# Patient Record
Sex: MALE | Race: WHITE | NOT HISPANIC OR LATINO | Employment: OTHER | URBAN - METROPOLITAN AREA
[De-identification: names, ages, dates, MRNs, and addresses within clinical notes are randomized per-mention and may not be internally consistent; named-entity substitution may affect disease eponyms.]

---

## 2018-09-02 ENCOUNTER — APPOINTMENT (OUTPATIENT)
Dept: GENERAL RADIOLOGY | Facility: CLINIC | Age: 63
End: 2018-09-02
Attending: EMERGENCY MEDICINE
Payer: COMMERCIAL

## 2018-09-02 ENCOUNTER — HOSPITAL ENCOUNTER (EMERGENCY)
Facility: CLINIC | Age: 63
Discharge: HOME | End: 2018-09-03
Attending: EMERGENCY MEDICINE | Admitting: EMERGENCY MEDICINE
Payer: COMMERCIAL

## 2018-09-02 DIAGNOSIS — R07.89 LEFT-SIDED CHEST WALL PAIN: ICD-10-CM

## 2018-09-02 PROCEDURE — 99284 EMERGENCY DEPT VISIT MOD MDM: CPT | Mod: Z6 | Performed by: EMERGENCY MEDICINE

## 2018-09-02 PROCEDURE — 99284 EMERGENCY DEPT VISIT MOD MDM: CPT | Mod: 25

## 2018-09-02 PROCEDURE — 96374 THER/PROPH/DIAG INJ IV PUSH: CPT

## 2018-09-02 PROCEDURE — 25000128 H RX IP 250 OP 636: Performed by: EMERGENCY MEDICINE

## 2018-09-02 PROCEDURE — 71101 X-RAY EXAM UNILAT RIBS/CHEST: CPT | Mod: LT

## 2018-09-02 RX ORDER — KETOROLAC TROMETHAMINE 30 MG/ML
30 INJECTION, SOLUTION INTRAMUSCULAR; INTRAVENOUS ONCE
Status: COMPLETED | OUTPATIENT
Start: 2018-09-02 | End: 2018-09-02

## 2018-09-02 RX ADMIN — KETOROLAC TROMETHAMINE 30 MG: 30 INJECTION, SOLUTION INTRAMUSCULAR at 23:54
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
## 2018-09-02 NOTE — ED AVS SNAPSHOT
[facility] Emergency Department    [address] [facility] [address]    Phone:  [phone]    Fax:  [phone]                                       Michael Ugarte   MRN: [id number]    Department:  [facility] Emergency Department   Date of Visit:  9/2/2018           After Visit Summary Signature Page     I have received my discharge instructions, and my questions have been answered. I have discussed any challenges I see with this plan with the nurse or doctor.    ..........................................................................................................................................  Patient/Patient Representative Signature      ..........................................................................................................................................  Patient Representative Print Name and Relationship to Patient    ..................................................               ................................................  Date                                            Time    ..........................................................................................................................................  Reviewed by Signature/Title    ...................................................              ..............................................  Date                                                            Time          22EPIC Rev 08/18

## 2018-09-03 VITALS
DIASTOLIC BLOOD PRESSURE: 111 MMHG | OXYGEN SATURATION: 96 % | BODY MASS INDEX: 26.22 KG/M2 | RESPIRATION RATE: 18 BRPM | TEMPERATURE: 98.3 F | WEIGHT: 173 LBS | SYSTOLIC BLOOD PRESSURE: 160 MMHG | HEIGHT: 68 IN

## 2018-09-03 NOTE — DISCHARGE INSTRUCTIONS
X-ray shows no broken bones  Tylenol and ibuprofen as needed for pain        Chest Wall Pain: Costochondritis    The chest pain that you have had today is caused by costochondritis. This condition is caused by an inflammation of the cartilage joining your ribs to your breastbone. It is not caused by heart or lung problems. Your healthcare team has made sure that the chest pain you feel is not from a life threatening cause of chest pain such as heart attack, collapsed lung, blood clot in the lung, tear in the aorta, or esophageal rupture. The inflammation may have been brought on by a blow to the chest, lifting heavy objects, intense exercise, or an illness that made you cough and sneeze a lot. It often occurs during times of emotional stress. It can be painful, but it is not dangerous. It usually goes away in 1 to 2 weeks. But it may happen again. Rarely, a more serious condition may cause symptoms similar to costochondritis. That s why it s important to watch for the warning signs listed below.  Home care  Follow these guidelines when caring for yourself at home:    If you feel that emotional stress is a cause of your condition, try to figure out the sources of that stress. It may not be obvious. Learn ways to deal with the stress in your life. This can include regular exercise, muscle relaxation, meditation, or simply taking time out for yourself.    You may use acetaminophen, ibuprofen, or naproxen to control pain, unless another pain medicine was prescribed. If you have liver or kidney disease or ever had a stomach ulcer, talk with your healthcare provider before using these medicines.    You can also help ease pain by using a hot, wet compress or heating pad. Use this with or without a medicated skin cream that helps relieves pain.    Do stretching exercise as advised by your provider.    Take any prescribed medicines as directed.  Follow-up care  Follow up with your healthcare provider, or as advised, if you  do not start to get better in the next 2 days.  When to seek medical advice  Call your healthcare provider right away if any of these occur:    A change in the type of pain. Call if it feels different, becomes more serious, lasts longer, or spreads into your shoulder, arm, neck, jaw, or back.    Shortness of breath or pain gets worse when you breathe    Weakness, dizziness, or fainting    Cough with dark-colored sputum (phlegm) or blood    Abdominal pain    Dark red or black stools    Fever of 100.4 F (38 C) or higher, or as directed by your healthcare provider  Date Last Reviewed: 12/1/2016 2000-2017 [employer]. [address] [place] [address], [place] [address]. All rights reserved. This information is not intended as a substitute for professional medical care. Always follow your healthcare professional's instructions.
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
## 2021-05-24 ENCOUNTER — RECORDS - HEALTHEAST (OUTPATIENT)
Dept: ADMINISTRATIVE | Facility: CLINIC | Age: 66
End: 2021-05-24

## 2021-05-27 ENCOUNTER — RECORDS - HEALTHEAST (OUTPATIENT)
Dept: ADMINISTRATIVE | Facility: CLINIC | Age: 66
End: 2021-05-27

## 2021-05-28 ENCOUNTER — RECORDS - HEALTHEAST (OUTPATIENT)
Dept: ADMINISTRATIVE | Facility: CLINIC | Age: 66
End: 2021-05-28

## 2021-09-06 ENCOUNTER — HOSPITAL ENCOUNTER (EMERGENCY)
Facility: CLINIC | Age: 66
Discharge: HOME | End: 2021-09-06
Attending: NURSE PRACTITIONER | Admitting: NURSE PRACTITIONER
Payer: COMMERCIAL

## 2021-09-06 ENCOUNTER — APPOINTMENT (OUTPATIENT)
Dept: GENERAL RADIOLOGY | Facility: CLINIC | Age: 66
End: 2021-09-06
Attending: NURSE PRACTITIONER
Payer: COMMERCIAL

## 2021-09-06 VITALS
HEART RATE: 95 BPM | TEMPERATURE: 98.4 F | BODY MASS INDEX: 26.61 KG/M2 | WEIGHT: 175 LBS | SYSTOLIC BLOOD PRESSURE: 169 MMHG | DIASTOLIC BLOOD PRESSURE: 105 MMHG | RESPIRATION RATE: 16 BRPM | OXYGEN SATURATION: 97 %

## 2021-09-06 DIAGNOSIS — S22.39XA RIB FRACTURE: ICD-10-CM

## 2021-09-06 PROCEDURE — 99284 EMERGENCY DEPT VISIT MOD MDM: CPT | Performed by: NURSE PRACTITIONER

## 2021-09-06 PROCEDURE — 71101 X-RAY EXAM UNILAT RIBS/CHEST: CPT | Mod: RT

## 2021-09-06 PROCEDURE — 99283 EMERGENCY DEPT VISIT LOW MDM: CPT | Performed by: NURSE PRACTITIONER

## 2021-09-06 RX ORDER — HYDROCODONE BITARTRATE AND ACETAMINOPHEN 5; 325 MG/1; MG/1
1 TABLET ORAL EVERY 6 HOURS PRN
Qty: 10 TABLET | Refills: 0 | Status: SHIPPED | OUTPATIENT
Start: 2021-09-06 | End: 2021-09-09

## 2021-09-06 ASSESSMENT — ENCOUNTER SYMPTOMS
NECK STIFFNESS: 0
BACK PAIN: 1
NECK PAIN: 0
WOUND: 1
MYALGIAS: 1
GASTROINTESTINAL NEGATIVE: 1
CARDIOVASCULAR NEGATIVE: 1
ARTHRALGIAS: 0
CONSTITUTIONAL NEGATIVE: 1
NEUROLOGICAL NEGATIVE: 1
JOINT SWELLING: 0
RESPIRATORY NEGATIVE: 1

## 2021-09-06 NOTE — ED TRIAGE NOTES
"Pt fell backwards onto a coffee table on Friday while trying to take off shoes.  Pt states that pain is worsening over weekend, felt a \"pop\" or a \"snap\" last night when he rolled over in bed and pain got worse.  Pt ambulates slowly d/t pain. No changes in bowel/bladder function.   "

## 2021-09-06 NOTE — ED PROVIDER NOTES
History     Chief Complaint   Patient presents with     Back Pain     HPI  Michael Ugarte is a 65 year old male who presents to the emergency department for evaluation of right posterior rib pain after falling 3 days ago.  Patient was standing trying to remove his shoe when he lost balance and fell backwards striking the corner of the wooden table to the right posterior ribs.  Patient denies any head injury or loss of consciousness.  No midline back pain.  No loss of bowel or bladder control, no numbness or tingling.  Difficulty sleeping due to pain.  Has been trying over-the-counter options without relief.    Allergies:  No Known Allergies    Problem List:    There are no problems to display for this patient.       Past Medical History:    No past medical history on file.    Past Surgical History:    No past surgical history on file.    Family History:    No family history on file.    Social History:  Marital Status:   [2]  Social History     Tobacco Use     Smoking status: Not on file   Substance Use Topics     Alcohol use: Not on file     Drug use: Not on file        Medications:    HYDROcodone-acetaminophen (NORCO) 5-325 MG tablet          Review of Systems   Constitutional: Negative.    Respiratory: Negative.    Cardiovascular: Negative.    Gastrointestinal: Negative.    Musculoskeletal: Positive for back pain and myalgias. Negative for arthralgias, gait problem, joint swelling, neck pain and neck stiffness.   Skin: Positive for wound.   Neurological: Negative.        Physical Exam   BP: (!) 169/105  Pulse: 95  Temp: 98.4  F (36.9  C)  Resp: 16  Weight: 79.4 kg (175 lb)  SpO2: 97 %      Physical Exam  Constitutional:       General: He is not in acute distress.     Appearance: He is well-developed. He is not diaphoretic.   HENT:      Head: Normocephalic.   Eyes:      Conjunctiva/sclera: Conjunctivae normal.      Pupils: Pupils are equal, round, and reactive to light.   Cardiovascular:      Rate and  Rhythm: Normal rate and regular rhythm.      Pulses: Normal pulses.   Pulmonary:      Effort: Pulmonary effort is normal. No respiratory distress.      Breath sounds: Normal breath sounds and air entry. No decreased air movement. No decreased breath sounds, wheezing or rhonchi.   Abdominal:      General: There is no distension.      Palpations: Abdomen is soft.      Tenderness: There is no abdominal tenderness.   Musculoskeletal:      Cervical back: Normal range of motion and neck supple.        Back:       Comments: Superficial abrasion to the right posterior area indicated above. Surrounding tenderness. No edema, erythema or ecchymosis. No difficulty breathing.    Skin:     General: Skin is warm.      Capillary Refill: Capillary refill takes less than 2 seconds.   Neurological:      General: No focal deficit present.      Mental Status: He is alert and oriented to person, place, and time.   Psychiatric:         Mood and Affect: Mood normal.         ED Course        Procedures    Results for orders placed or performed during the hospital encounter of 09/06/21 (from the past 24 hour(s))   Ribs XR, unilat 3 views + PA chest, right    Narrative    XR RIBS & CHEST RT 3VW 9/6/2021 11:18 AM     HISTORY: fall, right lower posterior rib pain  COMPARISON: 9/2/2018      Impression    IMPRESSION: Acute minimally displaced anterior right 10th rib  fracture. No discernible pneumothorax. No evidence of acute  cardiopulmonary disease.    ALONZO NEWBERRY MD         SYSTEM ID:  [id number]       Medications - No data to display    Assessments & Plan (with Medical Decision Making)   Patient is a 65-year-old male presents to the ED emergency department for evaluation of right posterior back pain after sustaining a fall 3 days ago.  Tenderness as expected on physical exam, no worrisome findings.  X-ray with minimally displaced right anterior 10th rib fracture.  Discussed rib fracture and average course of healing.  Discussed home  symptom management and limited course of Norco.  Discussed safe use of narcotics.  Follow-up with primary care if needed.  Return to the emergency department for new or worsening symptoms including respiratory distress.  Patient agreeable to plan of care and discharged in good condition.  I have reviewed the nursing notes.    I have reviewed the findings, diagnosis, plan and need for follow up with the patient.    Discharge Medication List as of 9/6/2021 12:00 PM      START taking these medications    Details   HYDROcodone-acetaminophen (NORCO) 5-325 MG tablet Take 1 tablet by mouth every 6 hours as needed for severe pain, Disp-10 tablet, R-0, E-Prescribe             Final diagnoses:   Rib fracture       9/6/2021   [facility] EMERGENCY DEPT     Mira Friedman, APRN CNP  09/06/21 1534

## 2022-09-05 ENCOUNTER — ANESTHESIA EVENT (OUTPATIENT)
Dept: EMERGENCY MEDICINE | Facility: CLINIC | Age: 67
End: 2022-09-05
Payer: COMMERCIAL

## 2022-09-05 ENCOUNTER — HOSPITAL ENCOUNTER (EMERGENCY)
Facility: CLINIC | Age: 67
Discharge: HOME | End: 2022-09-05
Attending: EMERGENCY MEDICINE | Admitting: EMERGENCY MEDICINE
Payer: COMMERCIAL

## 2022-09-05 ENCOUNTER — APPOINTMENT (OUTPATIENT)
Dept: GENERAL RADIOLOGY | Facility: CLINIC | Age: 67
End: 2022-09-05
Attending: EMERGENCY MEDICINE
Payer: COMMERCIAL

## 2022-09-05 ENCOUNTER — ANESTHESIA (OUTPATIENT)
Dept: EMERGENCY MEDICINE | Facility: CLINIC | Age: 67
End: 2022-09-05
Payer: COMMERCIAL

## 2022-09-05 VITALS
TEMPERATURE: 98.8 F | OXYGEN SATURATION: 96 % | HEART RATE: 112 BPM | BODY MASS INDEX: 27.47 KG/M2 | WEIGHT: 175 LBS | SYSTOLIC BLOOD PRESSURE: 167 MMHG | HEIGHT: 67 IN | RESPIRATION RATE: 19 BRPM | DIASTOLIC BLOOD PRESSURE: 106 MMHG

## 2022-09-05 DIAGNOSIS — M25.512 ACUTE PAIN OF LEFT SHOULDER: ICD-10-CM

## 2022-09-05 DIAGNOSIS — S43.005A SHOULDER DISLOCATION, LEFT, INITIAL ENCOUNTER: ICD-10-CM

## 2022-09-05 PROCEDURE — 23655 CLTX SHO DSLC W/MNPJ W/ANES: CPT | Mod: 54 | Performed by: EMERGENCY MEDICINE

## 2022-09-05 PROCEDURE — 23655 CLTX SHO DSLC W/MNPJ W/ANES: CPT | Mod: LT | Performed by: EMERGENCY MEDICINE

## 2022-09-05 PROCEDURE — 258N000003 HC RX IP 258 OP 636: Performed by: EMERGENCY MEDICINE

## 2022-09-05 PROCEDURE — 370N000003 HC ANESTHESIA WARD SERVICE

## 2022-09-05 PROCEDURE — 250N000011 HC RX IP 250 OP 636: Performed by: NURSE ANESTHETIST, CERTIFIED REGISTERED

## 2022-09-05 PROCEDURE — 99285 EMERGENCY DEPT VISIT HI MDM: CPT | Mod: 25 | Performed by: EMERGENCY MEDICINE

## 2022-09-05 PROCEDURE — 250N000011 HC RX IP 250 OP 636: Performed by: EMERGENCY MEDICINE

## 2022-09-05 PROCEDURE — 96374 THER/PROPH/DIAG INJ IV PUSH: CPT | Performed by: EMERGENCY MEDICINE

## 2022-09-05 PROCEDURE — 999N000065 XR SHOULDER LEFT 2 VIEWS: Mod: LT

## 2022-09-05 PROCEDURE — 96361 HYDRATE IV INFUSION ADD-ON: CPT | Performed by: EMERGENCY MEDICINE

## 2022-09-05 PROCEDURE — 73030 X-RAY EXAM OF SHOULDER: CPT | Mod: LT

## 2022-09-05 RX ORDER — FENTANYL CITRATE 50 UG/ML
50 INJECTION, SOLUTION INTRAMUSCULAR; INTRAVENOUS ONCE
Status: COMPLETED | OUTPATIENT
Start: 2022-09-05 | End: 2022-09-05

## 2022-09-05 RX ORDER — DOXAZOSIN 2 MG/1
2 TABLET ORAL DAILY
COMMUNITY
Start: 2022-05-23

## 2022-09-05 RX ORDER — ATORVASTATIN CALCIUM 20 MG/1
1 TABLET, FILM COATED ORAL DAILY
COMMUNITY
Start: 2022-08-28

## 2022-09-05 RX ORDER — PROPOFOL 10 MG/ML
INJECTION, EMULSION INTRAVENOUS PRN
Status: DISCONTINUED | OUTPATIENT
Start: 2022-09-05 | End: 2022-09-05

## 2022-09-05 RX ORDER — AMLODIPINE BESYLATE 10 MG/1
1 TABLET ORAL DAILY
COMMUNITY
Start: 2022-08-28

## 2022-09-05 RX ORDER — FINASTERIDE 5 MG/1
1 TABLET, FILM COATED ORAL EVERY MORNING
COMMUNITY
Start: 2022-08-28

## 2022-09-05 RX ORDER — SODIUM CHLORIDE 9 MG/ML
INJECTION, SOLUTION INTRAVENOUS CONTINUOUS
Status: DISCONTINUED | OUTPATIENT
Start: 2022-09-05 | End: 2022-09-05 | Stop reason: HOSPADM

## 2022-09-05 RX ADMIN — SODIUM CHLORIDE: 9 INJECTION, SOLUTION INTRAVENOUS at 08:57

## 2022-09-05 RX ADMIN — PROPOFOL 100 MG: 10 INJECTION, EMULSION INTRAVENOUS at 09:41

## 2022-09-05 RX ADMIN — FENTANYL CITRATE 50 MCG: 50 INJECTION, SOLUTION INTRAMUSCULAR; INTRAVENOUS at 09:38

## 2022-09-05 RX ADMIN — PROPOFOL 50 MG: 10 INJECTION, EMULSION INTRAVENOUS at 09:44

## 2022-09-05 ASSESSMENT — ACTIVITIES OF DAILY LIVING (ADL)
ADLS_ACUITY_SCORE: 35
ADLS_ACUITY_SCORE: 35

## 2022-09-05 NOTE — ANESTHESIA PREPROCEDURE EVALUATION
Anesthesia Pre-Procedure Evaluation    Patient: Michael Ugarte   MRN: [id number] : 1955        Procedure :           No past medical history on file.   No past surgical history on file.   No Known Allergies   Social History     Tobacco Use     Smoking status: Not on file     Smokeless tobacco: Not on file   Substance Use Topics     Alcohol use: Not on file      Wt Readings from Last 1 Encounters:   22 79.4 kg (175 lb)        Anesthesia Evaluation   Pt has had prior anesthetic.         ROS/MED HX  ENT/Pulmonary:  - neg pulmonary ROS     Neurologic:  - neg neurologic ROS     Cardiovascular:     (+) Dyslipidemia hypertension-----    METS/Exercise Tolerance:     Hematologic:  - neg hematologic  ROS     Musculoskeletal:   (+) arthritis,     GI/Hepatic:     (+) GERD,     Renal/Genitourinary:     (+) BPH,     Endo:  - neg endo ROS     Psychiatric/Substance Use:  - neg psychiatric ROS     Infectious Disease:  - neg infectious disease ROS     Malignancy:  - neg malignancy ROS     Other:  - neg other ROS          Physical Exam    Airway        Mallampati: II   TM distance: > 3 FB   Neck ROM: full   Mouth opening: > 3 cm    Respiratory Devices and Support         Dental  no notable dental history         Cardiovascular   cardiovascular exam normal          Pulmonary   pulmonary exam normal                OUTSIDE LABS:  CBC: No results found for: WBC, HGB, HCT, PLT  BMP: No results found for: NA, POTASSIUM, CHLORIDE, CO2, BUN, CR, GLC  COAGS: No results found for: PTT, INR, FIBR  POC: No results found for: BGM, HCG, HCGS  HEPATIC: No results found for: ALBUMIN, PROTTOTAL, ALT, AST, GGT, ALKPHOS, BILITOTAL, BILIDIRECT, AMBROSE  OTHER: No results found for: PH, LACT, A1C, JUSTYN, PHOS, MAG, LIPASE, AMYLASE, TSH, T4, T3, CRP, SED    Anesthesia Plan    ASA Status:  2      Anesthesia Type: General.   Induction: Propofol.   Maintenance: TIVA.        Consents    Anesthesia Plan(s) and associated risks, benefits, and  realistic alternatives discussed. Questions answered and patient/representative(s) expressed understanding.     - Discussed: Risks, Benefits and Alternatives for BOTH SEDATION and the PROCEDURE were discussed     - Discussed with:  Patient         Postoperative Care    Pain management: IV analgesics, Oral pain medications, Multi-modal analgesia.   PONV prophylaxis: Ondansetron (or other 5HT-3), Dexamethasone or Solumedrol     Comments:                TADEO Laureano CRNA

## 2022-09-05 NOTE — ED PROVIDER NOTES
"  History     Chief Complaint   Patient presents with     Shoulder Injury     HPI  Michael Ugarte is a 66 year old male with history of hypertension, hypercholesterolemia, tobacco use, with left shoulder pain in setting of fall.  Patient reports he was \"drinking and dancing\" last evening at his house and had a fall onto his left arm.  He says he does not recall the details of how he fell or his position at the time.  Had pain in his left shoulder and ultimately went to bed.  Pain continued this morning and had difficulty moving his arm.  Also reports numbness to his medial left forearm.  Last ate approximately 5 PM last evening.  Had a few sips of water this morning.  No other injuries or complaints.  Denies headache, nausea, vomiting, neck or back pain.  Not on anticoagulants.    The patient's PMHx, Surgical Hx, Allergies, and Medications were all reviewed with the patient.    Allergies:  No Known Allergies    Problem List:    Patient Active Problem List    Diagnosis Date Noted     BPH (benign prostatic hyperplasia) 06/27/2012     Priority: Medium     Formatting of this note might be different from the original.  2-plus, firm, homogenous.       HTN (hypertension) 04/08/2009     Priority: Medium     Hypercholesterolemia 04/08/2009     Priority: Medium     GERD (gastroesophageal reflux disease) 04/08/2009     Priority: Medium        Past Medical History:    No past medical history on file.    Past Surgical History:    No past surgical history on file.    Family History:    No family history on file.    Social History:  Marital Status:   [2]        Medications:    amLODIPine (NORVASC) 10 MG tablet  atorvastatin (LIPITOR) 20 MG tablet  doxazosin (CARDURA) 2 MG tablet  finasteride (PROSCAR) 5 MG tablet  omeprazole (PRILOSEC) 20 MG DR capsule          Review of Systems  A complete review of systems performed and is otherwise negative except as noted in the HPI    Physical Exam   BP: (!) 152/84  Pulse: " "106  Temp: 97.5  F (36.4  C)  Resp: 16  Height: 170.2 cm (5' 7\")  Weight: 79.4 kg (175 lb)  SpO2: 97 %    Physical Exam  GEN: Awake, alert, and cooperative.  Appears distressed secondary to pain  HENT: MMM. External ears and nose normal bilaterally.  Atraumatic  EYES: EOM intact. Conjunctiva clear. No discharge.   NECK: Symmetric, freely mobile.  No posterior midline tenderness.  CV : Tachycardic rate.  Regular rhythm.  PULM: Normal effort. No wheezes, rales, or rhonchi bilaterally.  BACK: No midline spinal tenderness to palpation.  No CVA tenderness.  No ecchymosis or abrasion.  ABD: Soft, non-tender, non-distended. No rebound or guarding.   NEURO: Normal speech. Following commands. CN II-XII grossly intact. Answering questions and interacting appropriately.   EXT: Anterior sulcus sign left shoulder.  Significant tenderness to AC joint.  Normal  strength and hand.  Radial/medial/ulnar testing and hand intact.  Does have decreased sensation on medial forearm.  Sensation intact over deltoid.  Brisk capillary refill and strong radial pulse.  INT: Warm. No diaphoresis. Normal color.        ED Course        [facility]    -Dislocation - Upper Extremity    Date/Time: 9/5/2022 10:27 AM  Performed by: Mauricio Mendoza MD  Authorized by: Mauricio Mendoza MD     Risks, benefits and alternatives discussed.      LOCATION     Location:  Shoulder    Shoulder location:  L shoulder    Shoulder dislocation type: inferior      Chronicity:  New    PRE PROCEDURE ASSESSMENT      Pre-procedure imaging:  X-ray    Imaging findings: dislocation present      Fracture of greater humeral tuberosity: no      Hill-Sachs deformity: no      Distal perfusion: normal      PROCEDURE DETAILS      Manipulation performed: yes      Shoulder reduction method:  Traction and counter traction and external rotation    Reduction successful: yes      Reduction confirmed with imaging: yes      Immobilization:  " Strapping    POST PROCEDURE DETAILS     Neurological function: normal      Distal perfusion: normal      Range of motion: improved        PROCEDURE    Patient Tolerance:  Patient tolerated the procedure well with no immediate complications         Critical Care time:  none               Results for orders placed or performed during the hospital encounter of 09/05/22 (from the past 24 hour(s))   XR Shoulder Left 2 Views    Narrative    EXAM: XR SHOULDER LEFT 2 VIEWS  LOCATION: [facility]  DATE/TIME: 09/05/2022, 8:30 AM    INDICATION: Left shoulder pain.  COMPARISON: None.      Impression    IMPRESSION:   1.  Anteroinferior dislocation of the left glenohumeral joint.  2.  Small avulsive bone fragment projecting at the posterosuperior margin of the left glenoid.  3.  Old healed fracture of the left clavicle.  4.  Mild acromioclavicular arthrosis.      XR Shoulder Left 2 Views    Narrative    EXAM: XR SHOULDER LEFT 2 VIEWS  LOCATION: [facility]  DATE/TIME: 9/5/2022 9:51 AM    INDICATION: Left shoulder dislocation. Interval reduction.  COMPARISON: 09/05/2022 0827 hours.      Impression    IMPRESSION:   1.  Normal left glenohumeral joint alignment with interval reduction.  2.  Stable bone fragment at the posterosuperior margin of the glenoid, likely sequelae of old fracture.  3.  Old healed fracture of the clavicle diaphysis.  4.  Mild acromioclavicular arthrosis.        Medications   sodium chloride 0.9% infusion ( Intravenous New Bag 9/5/22 0857)   fentaNYL (PF) (SUBLIMAZE) injection 50 mcg (50 mcg Intravenous Given 9/5/22 0938)       Assessments & Plan (with Medical Decision Making)   66 year old male with acute left shoulder pain in setting of fall at home last evening and found to have anterior inferior shoulder dislocation on the left.  There is a likely avulsion of the glenoid at the superior posterior aspect.  Strong radial pulses and brisk capillary refill  in left upper extremity.  Does have decreased sensation on the medial left forearm.  No decrease in sensation over deltoid.  No other injuries identified.  No neck or back pain.  No overt signs of head trauma.  Not on anticoagulants.  No nausea or vomiting, headache or change in mental status.  50 mill equivalents IV fentanyl for pain control.    Procedural sedation with propofol by anesthesia and successful reduction with traction/countertraction as well as external rotation.  No tactile response of joint relocation however post reduction films confirm relocation.  Patient placed in shoulder immobilizer.  Patient observed for more than 1 hour after procedure.  Will need a ride home.  Orthopedic  referral placed for follow-up.  OTC pain control. Ed return precautions discussed.     I have reviewed the nursing notes.         New Prescriptions    No medications on file       Final diagnoses:   Shoulder dislocation, left, initial encounter   Acute pain of left shoulder     Mauricio Mendoza MD    9/5/2022   [facility] EMERGENCY DEPT    Disclaimer: This note consists of words and symbols derived from keyboarding and dictation using voice recognition software.  As a result, there may be errors that have gone undetected.  Please consider this when interpreting information found in this note.             Mauricio Mendoza MD  09/05/22 7575

## 2022-09-05 NOTE — ED TRIAGE NOTES
Left shoulder pain  Fell yesterday       Triage Assessment     Row Name 09/05/22 0742       Triage Assessment (Adult)    Airway WDL WDL       Respiratory WDL    Respiratory WDL WDL       Skin Circulation/Temperature WDL    Skin Circulation/Temperature WDL WDL       Cardiac WDL    Cardiac WDL WDL       Peripheral/Neurovascular WDL    Peripheral Neurovascular WDL WDL       Cognitive/Neuro/Behavioral WDL    Cognitive/Neuro/Behavioral WDL WDL

## 2022-09-05 NOTE — DISCHARGE INSTRUCTIONS
You dislocated your left shoulder.     Wear shoulder immobilizer until you follow up with Orthopedics. You may remove for showering.    Ibuprofen and tylenol as needed for pain.     If you have uncontrollable pain, new numbness or skin color changes in arm/hand, or develop other new symptoms which you find concerning, please return to the Emergency Department for further evaluation and treatment.

## 2022-09-05 NOTE — ANESTHESIA POSTPROCEDURE EVALUATION
Patient: Michael Ugarte    Procedure: * No procedures listed *       Anesthesia Type:  General    Note:  Disposition: Outpatient   Postop Pain Control: Uneventful            Sign Out: Well controlled pain   PONV: No   Neuro/Psych: Uneventful            Sign Out: Acceptable/Baseline neuro status   Airway/Respiratory: Uneventful            Sign Out: Acceptable/Baseline resp. status   CV/Hemodynamics: Uneventful            Sign Out: Acceptable CV status; No obvious hypovolemia; No obvious fluid overload   Other NRE: NONE   DID A NON-ROUTINE EVENT OCCUR? No           Last vitals:  Vitals:    09/05/22 0944 09/05/22 0949 09/05/22 0953   BP:  (!) 150/127    Pulse: (!) 128 (!) 141 (!) 129   Resp: 20 16 18   Temp:      SpO2: 96% 99% 99%       Electronically Signed By: TADEO Laureano CRNA  September 5, 2022  9:57 AM

## 2022-09-05 NOTE — ANESTHESIA CARE TRANSFER NOTE
Patient: Michael Ugarte    Procedure: * No procedures listed *       Diagnosis: * No pre-op diagnosis entered *  Diagnosis Additional Information: No value filed.    Anesthesia Type:   General     Note:    Oropharynx: oropharynx clear of all foreign objects  Level of Consciousness: awake  Oxygen Supplementation: nasal cannula    Independent Airway: airway patency satisfactory and stable  Dentition: dentition unchanged  Vital Signs Stable: post-procedure vital signs reviewed and stable  Report to RN Given: handoff report given  Patient transferred to: Emergency Department    Handoff Report: Identifed the Patient, Identified the Reponsible Provider, Reviewed the pertinent medical history, Discussed the surgical course, Reviewed Intra-OP anesthesia mangement and issues during anesthesia, Set expectations for post-procedure period and Allowed opportunity for questions and acknowledgement of understanding      Vitals:  Vitals Value Taken Time   /104 09/05/22 0955   Temp     Pulse 128 09/05/22 0956   Resp 7 09/05/22 0956   SpO2 98 % 09/05/22 0956   Vitals shown include unvalidated device data.    Electronically Signed By: TADEO Laureano CRNA  September 5, 2022  9:57 AM

## 2022-09-21 ENCOUNTER — ANCILLARY PROCEDURE (OUTPATIENT)
Dept: GENERAL RADIOLOGY | Facility: CLINIC | Age: 67
End: 2022-09-21
Attending: PEDIATRICS
Payer: COMMERCIAL

## 2022-09-21 ENCOUNTER — OFFICE VISIT (OUTPATIENT)
Dept: ORTHOPEDICS | Facility: CLINIC | Age: 67
End: 2022-09-21
Attending: EMERGENCY MEDICINE

## 2022-09-21 VITALS
WEIGHT: 175 LBS | HEART RATE: 101 BPM | BODY MASS INDEX: 27.41 KG/M2 | SYSTOLIC BLOOD PRESSURE: 153 MMHG | DIASTOLIC BLOOD PRESSURE: 95 MMHG

## 2022-09-21 DIAGNOSIS — S43.005A SHOULDER DISLOCATION, LEFT, INITIAL ENCOUNTER: ICD-10-CM

## 2022-09-21 PROCEDURE — 99204 OFFICE O/P NEW MOD 45 MIN: CPT | Performed by: PEDIATRICS

## 2022-09-21 PROCEDURE — 73030 X-RAY EXAM OF SHOULDER: CPT | Mod: TC | Performed by: RADIOLOGY

## 2022-09-21 RX ORDER — IBUPROFEN 800 MG/1
800 TABLET, FILM COATED ORAL
COMMUNITY
Start: 2022-04-01

## 2022-09-21 NOTE — PROGRESS NOTES
ASSESSMENT & PLAN     was seen today for pain.    Diagnoses and all orders for this visit:    Shoulder dislocation, left, initial encounter  -     Orthopedic  Referral  -     XR Shoulder Left G/E 3 Views; Future  -     Physical Therapy Referral; Future      This issue is acute and Unchanged.  Some concern for rotator cuff injury given weakness, will monitor exam and consider MRI if symptoms persist.    Plan:  - Today's Plan of Care:  Sling for comfort - can start to wean out of the sling  Start gentle ROM exercises - Home Exercise Program given  Would limit overhead work and heavy lifting    Discussed activity considerations and other supportive care including Ice/Heat, OTC and other topical medications as needed.    Rehab: Physical Therapy: [facility] - [phone]    -We also discussed other future treatment options:  MRI if weakness persists    Follow Up: 2-3 weeks    Concerning signs and symptoms were reviewed.  The patient expressed understanding of this management plan and all questions were answered at this time.    Almaz Chaudhari MD [facility]  Sports Medicine Physician  [facility]      -----  Chief Complaint   Patient presents with     Left Shoulder - Pain       SUBJECTIVE  Michael Ugarte is a/an 66 year old male who is seen as an ER referral for evaluation of left shoulder pain.  Was see in the ED on 9/5/22, but was injured on 9/4/22.       The patient is seen by themselves.    Onset: 2.5 week(s) ago. Patient describes injury as falling backwards into a tub.   Location of Pain: left shoulder; posterior  Worsened by: not moving it  Better with: immobilizer  Treatments tried: rest/activity avoidance, ibuprofen 800mg, previous imaging (xray 9/5/22) and casting/splinting/bracing  Associated symptoms: weakness of left shoulder and feeling of instability, numbness and tingling in his hand/arm    Orthopedic/Surgical history: NO  Social History/Occupation:    No family  history pertinent to patient's problem today.    REVIEW OF SYSTEMS:  Review of Systems  Skin: no bruising, no swelling  Musculoskeletal: as above  Neurologic: no numbness, paresthesias  Remainder of review of systems is negative including constitutional, CV, pulmonary, GI, except as noted in HPI or medical history.    OBJECTIVE:  BP (!) 153/95   Pulse 101   Wt 79.4 kg (175 lb)   BMI 27.41 kg/m     General: healthy, alert and in no distress  HEENT: no scleral icterus or conjunctival erythema  Skin: no suspicious lesions or rash. No jaundice.  CV: distal perfusion intact  Resp: normal respiratory effort without conversational dyspnea   Psych: normal mood and affect  Gait: normal steady gait with appropriate coordination and balance  Neuro: Normal light sensory exam of upper extremity    Bilateral Shoulder exam    Inspection and Posture:       normal    Skin:        no visible deformities    Tender:        none    Non Tender:       remainder of shoulder bilateral    ROM:        forward flexion 90  left       abduction 90 left       internal rotation mimimal left       external rotation 25 left       asymmetric scapular motion    Painful motions:       flexion left       elevation left    Strength:        abduction 4/5 left       flexion 4/5 left       internal rotation 4/5 left       external rotation 4/5 left    Sensation:        Sensation intact over shoulder and upper extremity   - patient does describe anterior arm altered sensation    RADIOLOGY:  I independently ordered, visualized and reviewed these images with the patient  3 XR views of right shoulder reviewed: no acute bony abnormality, old healed clavicle deformity, no significant degenerative change  - will follow official read    Reviewed pre and postreduction x-rays from the ER 9/5/2022 -no acute fracture, old healed clavicle fracture deformity, stable bone fragment posterior margin of the glenoid    Reviewed ED visit.  Review of the result(s) of each  unique test - XR

## 2022-09-21 NOTE — LETTER
9/21/2022         RE: Michael Ugarte  [address] [facility] [address]        Dear Colleague,    Thank you for referring your patient, Michael Ugarte, to the [facility] [place]. Please see a copy of my visit note below.    ASSESSMENT & PLAN     was seen today for pain.    Diagnoses and all orders for this visit:    Shoulder dislocation, left, initial encounter  -     Orthopedic  Referral  -     XR Shoulder Left G/E 3 Views; Future  -     Physical Therapy Referral; Future      This issue is acute and Unchanged.  Some concern for rotator cuff injury given weakness, will monitor exam and consider MRI if symptoms persist.    Plan:  - Today's Plan of Care:  Sling for comfort - can start to wean out of the sling  Start gentle ROM exercises - Home Exercise Program given  Would limit overhead work and heavy lifting    Discussed activity considerations and other supportive care including Ice/Heat, OTC and other topical medications as needed.    Rehab: Physical Therapy: [facility] - [phone]    -We also discussed other future treatment options:  MRI if weakness persists    Follow Up: 2-3 weeks    Concerning signs and symptoms were reviewed.  The patient expressed understanding of this management plan and all questions were answered at this time.    Almaz Chaudhari MD [facility]  Sports Medicine Physician  [facility]      -----  Chief Complaint   Patient presents with     Left Shoulder - Pain       SUBJECTIVE  Michael Ugarte is a/an 66 year old male who is seen as an ER referral for evaluation of left shoulder pain.  Was see in the ED on 9/5/22, but was injured on 9/4/22.       The patient is seen by themselves.    Onset: 2.5 week(s) ago. Patient describes injury as falling backwards into a tub.   Location of Pain: left shoulder; posterior  Worsened by: not moving it  Better with: immobilizer  Treatments tried: rest/activity avoidance,  ibuprofen 800mg, previous imaging (xray 9/5/22) and casting/splinting/bracing  Associated symptoms: weakness of left shoulder and feeling of instability, numbness and tingling in his hand/arm    Orthopedic/Surgical history: NO  Social History/Occupation:    No family history pertinent to patient's problem today.    REVIEW OF SYSTEMS:  Review of Systems  Skin: no bruising, no swelling  Musculoskeletal: as above  Neurologic: no numbness, paresthesias  Remainder of review of systems is negative including constitutional, CV, pulmonary, GI, except as noted in HPI or medical history.    OBJECTIVE:  BP (!) 153/95   Pulse 101   Wt 79.4 kg (175 lb)   BMI 27.41 kg/m     General: healthy, alert and in no distress  HEENT: no scleral icterus or conjunctival erythema  Skin: no suspicious lesions or rash. No jaundice.  CV: distal perfusion intact  Resp: normal respiratory effort without conversational dyspnea   Psych: normal mood and affect  Gait: normal steady gait with appropriate coordination and balance  Neuro: Normal light sensory exam of upper extremity    Bilateral Shoulder exam    Inspection and Posture:       normal    Skin:        no visible deformities    Tender:        none    Non Tender:       remainder of shoulder bilateral    ROM:        forward flexion 90  left       abduction 90 left       internal rotation mimimal left       external rotation 25 left       asymmetric scapular motion    Painful motions:       flexion left       elevation left    Strength:        abduction 4/5 left       flexion 4/5 left       internal rotation 4/5 left       external rotation 4/5 left    Sensation:        Sensation intact over shoulder and upper extremity   - patient does describe anterior arm altered sensation    RADIOLOGY:  I independently ordered, visualized and reviewed these images with the patient  3 XR views of right shoulder reviewed: no acute bony abnormality, old healed clavicle deformity, no significant degenerative  change  - will follow official read    Reviewed pre and postreduction x-rays from the ER 9/5/2022 -no acute fracture, old healed clavicle fracture deformity, stable bone fragment posterior margin of the glenoid    Reviewed ED visit.  Review of the result(s) of each unique test - XR             Again, thank you for allowing me to participate in the care of your patient.        Sincerely,        Almaz Chaudhari MD

## 2022-09-21 NOTE — PATIENT INSTRUCTIONS
Plan:  - Today's Plan of Care:  Sling for comfort - can start to wean out of the sling  Start gentle ROM exercises - Home Exercise Program given  Would limit overhead work and heavy lifting    Discussed activity considerations and other supportive care including Ice/Heat, OTC and other topical medications as needed.    Rehab: [facility] - [phone]    -We also discussed other future treatment options:  MRI if weakness persists    Follow Up: 2-3 weeks    If you have any further questions for your physician or physician s care team you can call [phone] and use option 3 to leave a voice message.

## 2022-10-05 ENCOUNTER — HOSPITAL ENCOUNTER (OUTPATIENT)
Dept: PHYSICAL THERAPY | Facility: CLINIC | Age: 67
Setting detail: THERAPIES SERIES
Discharge: HOME | End: 2022-10-05
Attending: PEDIATRICS
Payer: COMMERCIAL

## 2022-10-05 DIAGNOSIS — S43.005A SHOULDER DISLOCATION, LEFT, INITIAL ENCOUNTER: ICD-10-CM

## 2022-10-05 PROCEDURE — 97110 THERAPEUTIC EXERCISES: CPT | Mod: GP | Performed by: PHYSICAL THERAPIST

## 2022-10-05 PROCEDURE — 97161 PT EVAL LOW COMPLEX 20 MIN: CPT | Mod: GP | Performed by: PHYSICAL THERAPIST

## 2022-10-05 NOTE — PROGRESS NOTES
10/05/22 0800   General Information   Type of Visit Initial OP Ortho PT Evaluation   Start of Care Date 10/05/22   Referring Physician Almaz Chaudhari MD   Patient/Family Goals Statement To get my shoulder /arm back to normal--to do his normal activities   Orders Evaluate and Treat   Date of Order 09/21/22   Certification Required? No   Medical Diagnosis L shoulder dislocation   Body Part(s)   Body Part(s) Shoulder   Presentation and Etiology   Pertinent history of current problem (include personal factors and/or comorbidities that impact the POC) Pt states he dislocated his L shoulder on 9/4/22 w/ a fall.  Pt went to ER on 9/5/22 and had it relocated.  Pt initially wore a sling --has weaned out of it.  Currently intermittent L shoulder pain 6/10.  No radiating pain.  Pt reports constant numbness L radial forearm to shoulder.  xray in chart:                                                     IMPRESSION: No acute fracture or dislocation. Old healed fracture  deformity of the midportion of the left clavicular shaft. Osteopenia.   Meds:  ibuprofen prn.   Pt is R dominant.   PMHX:   HBP, arthritis. L collar bone fx.  Low complexity   Onset date of current episode/exacerbation 09/04/22   Pain exacerbation comment trying to avoid lifting anything.  Unable to do yardwork.  Limited reach overhead.  Reaching behind back to tuck in shirt.  Reaching across body w/ showering.  L sidelying.  Waking 3X/night.   Pain/symptoms eased by A. Sitting;F. Certain positions   Progression of symptoms since onset: Improved  (but no change in numbness)   Prior Level of Function   Functional Level Prior Comment yardwork, goes fishing.   Current Level of Function   Patient role/employment history F. Retired   Fall Risk Screen   Fall screen completed by PT   Have you fallen 2 or more times in the past year? Yes   Have you fallen and had an injury in the past year? Yes   Timed Up and Go score (seconds) 9.5   Is patient a fall risk? No    Abuse Screen (yes response referral indicated)   Feels Unsafe at Home or Work/School no   Feels Threatened by Someone no   Does Anyone Try to Keep You From Having Contact with Others or Doing Things Outside Your Home? no   Physical Signs of Abuse Present no   Shoulder Objective Findings   Side (if bilateral, select both right and left) Left   Shoulder ROM Comment Elbow flex AROM R 142*, L 132*--pt notes the nerve irritation limits   Palpation moderate tenderness L teres minor,  mild tenderness L biceps and infraspinatus   Observation FH,  L sulcus   Left Shoulder Flexion AROM R 158*, L 85*   Left Shoulder Flexion PROM 120*   Left Shoulder Abduction AROM scaption  R 165*, L 70*   Left Shoulder Abduction PROM scaption 115*   Left Shoulder ER AROM R 85*, L 60*   Left Shoulder ER PROM 70* w/ arm at side   Left Shoulder IR AROM R to T7, L to T 12   Left Shoulder IR PROM 63* in 40* ABD   Planned Therapy Interventions   Planned Therapy Interventions manual therapy;neuromuscular re-education;ROM;strengthening;stretching   Clinical Impression   Criteria for Skilled Therapeutic Interventions Met yes, treatment indicated   PT Diagnosis L shoulder dislocation   Influenced by the following impairments pain, numbness,  decreased ROM and decreased strength   Functional limitations due to impairments showering, reaching up and behind back, L sidelying,  sleeping   Clinical Presentation Stable/Uncomplicated   Clinical Presentation Rationale pt notes it is improving   Clinical Decision Making (Complexity) Low complexity   Therapy Frequency 1 time/week   Predicted Duration of Therapy Intervention (days/wks) 10 weeks   Risk & Benefits of therapy have been explained Yes   Patient, Family & other staff in agreement with plan of care Yes   Education Assessment   Preferred Learning Style Listening;Reading;Pictures/video;Demonstration   Barriers to Learning No barriers   ORTHO GOALS   PT Ortho Eval Goals 1;2;3;4;5   Ortho Goal 1   Goal  Identifier 1.   Goal Description Pt willl be able to reach to 145* for improved tolerance to ADL's   Target Date 11/19/22   Ortho Goal 2   Goal Identifier 2.   Goal Description Pt  will be able to reach behind to tuck in shirt w/o difficulty   Target Date 11/19/22   Ortho Goal 3   Goal Identifier 3.   Goal Description Pt will be able to lift upto 15# w/ minimal to no difficulty   Target Date 12/14/22   Ortho Goal 4   Goal Identifier 4.   Goal Description Pt will be able to lie on L side and wake no > 4x/week due to shoulder   Target Date 12/14/22   Ortho Goal 5   Goal Identifier 5.   Goal Description Pt will be independent and consistent w/HEP to manage symptoms   Target Date 12/14/22   Total Evaluation Time   PT [place], Low Complexity Minutes (43402) 22     Thank you for this referral,    Luma Newton, PT,   #[id number]  [facility]  [phone]

## 2022-10-21 ENCOUNTER — HOSPITAL ENCOUNTER (OUTPATIENT)
Dept: PHYSICAL THERAPY | Facility: CLINIC | Age: 67
Setting detail: THERAPIES SERIES
Discharge: HOME | End: 2022-10-21
Attending: PEDIATRICS
Payer: COMMERCIAL

## 2022-10-21 ENCOUNTER — OFFICE VISIT (OUTPATIENT)
Dept: ORTHOPEDICS | Facility: CLINIC | Age: 67
End: 2022-10-21
Payer: COMMERCIAL

## 2022-10-21 VITALS
WEIGHT: 175 LBS | DIASTOLIC BLOOD PRESSURE: 86 MMHG | SYSTOLIC BLOOD PRESSURE: 124 MMHG | BODY MASS INDEX: 27.41 KG/M2 | HEART RATE: 123 BPM

## 2022-10-21 DIAGNOSIS — S43.005D: Primary | ICD-10-CM

## 2022-10-21 PROBLEM — S42.92XD: Status: ACTIVE | Noted: 2022-10-21

## 2022-10-21 PROBLEM — S43.006A SHOULDER DISLOCATION: Status: ACTIVE | Noted: 2022-10-21

## 2022-10-21 PROCEDURE — 99214 OFFICE O/P EST MOD 30 MIN: CPT | Performed by: PEDIATRICS

## 2022-10-21 PROCEDURE — 97110 THERAPEUTIC EXERCISES: CPT | Mod: GP | Performed by: PHYSICAL THERAPIST

## 2022-10-21 NOTE — PROGRESS NOTES
ASSESSMENT & PLAN     was seen today for pain and follow up.    Diagnoses and all orders for this visit:    Shoulder joint dislocation, left, subsequent encounter  -     MR Shoulder Left w/o Contrast; Future      This issue is acute and Improving    We discussed these other possible diagnosis: given concern for rotator cuff injury, will obtain MRI    Plan:  - Today's Plan of Care:  MRI of the Left Shoulder - Call [phone] to schedule MRI   Discussed activity considerations and other supportive care including Ice/Heat, OTC and other topical medications as needed.  Continue Physical Therapy    -We also discussed other future treatment options:  Referral to Orthopedic Surgery    Follow Up: In clinic with Dr. Chaudhari after MRI (wait at least 1-2 days)     Concerning signs and symptoms were reviewed.  The patient expressed understanding of this management plan and all questions were answered at this time.    Almaz Chaudhari MD [facility]  Sports Medicine Physician  [facility]      SUBJECTIVE- Interim History October 21, 2022    Chief Complaint   Patient presents with     Left Shoulder - Pain, Follow Up       Michael Ugarte is a 67 year old male who is seen in f/u up for Shoulder joint dislocation, left, subsequent encounter. Since last visit on 9/21/22 patient has been feeling better. He is sore from physical therapy, but feels really good.  Physical Therapy is going great and he is progressing well. He continues to report numbness in the anterior forearm as well.  - Now ~ 7 weeks from initial onset; Patient describes injury as falling backwards into a tub.     Worsened by: moving a certain way, dressing, bathing, overhead  Better with: physical therapy, time, resting   Treatments tried: rest/activity avoidance, ibuprofen 800mg, previous imaging (xray 9/5/22) and casting/splinting/bracing, HEP, physical therapy (2 visits)  Associated symptoms: improved; weakness of left shoulder and feeling of  instability, continues to feel; numbness and tingling in his hand/arm     Orthopedic/Surgical history: NO  Social History/Occupation: retired    No family history pertinent to patient's problem today.    REVIEW OF SYSTEMS:  Review of Systems  Skin: no bruising, no swelling  Musculoskeletal: as above  Neurologic: no numbness, paresthesias  Remainder of review of systems is negative including constitutional, CV, pulmonary, GI, except as noted in HPI or medical history.    OBJECTIVE:  /86   Pulse (!) 123   Wt 79.4 kg (175 lb)   BMI 27.41 kg/m       General: healthy, alert and in no distress  HEENT: no scleral icterus or conjunctival erythema  Skin: no suspicious lesions or rash. No jaundice.  CV: distal perfusion intact  Resp: normal respiratory effort without conversational dyspnea   Psych: normal mood and affect  Gait: normal steady gait with appropriate coordination and balance  Neuro: Normal light sensory exam of upper extremity     Bilateral Shoulder exam  Inspection and Posture:       normal     Skin:        no visible deformities     Tender:        none     Non Tender:       remainder of shoulder bilateral     ROM:        forward flexion 120  left       abduction 120 left       internal rotation mimimal left       external rotation 25 left       asymmetric scapular motion     Painful motions:       flexion left       elevation left     Strength:        abduction 4/5 left       flexion 4/5 left       internal rotation 4/5 left       external rotation 4/5 left     Sensation:        Sensation intact over shoulder and upper extremity   - patient does describe anterior forearm altered sensation    RADIOLOGY:  Final results and radiologist's interpretation, available in the [facility] health record.  Images were reviewed with the patient in the office today.  My personal interpretation of the performed imaging:   3 XR views of right shoulder reviewed 9/21/2022: no acute bony abnormality, old healed clavicle  deformity, no significant degenerative change      Review of the result(s) of each unique test - XR

## 2022-10-21 NOTE — LETTER
10/21/2022         RE: Michael Ugarte  [address] [address]        Dear Colleague,    Thank you for referring your patient, Michael Ugarte, to the [facility] [place]. Please see a copy of my visit note below.    ASSESSMENT & PLAN     was seen today for pain and follow up.    Diagnoses and all orders for this visit:    Shoulder joint dislocation, left, subsequent encounter  -     MR Shoulder Left w/o Contrast; Future      This issue is acute and Improving    We discussed these other possible diagnosis: given concern for rotator cuff injury, will obtain MRI    Plan:  - Today's Plan of Care:  MRI of the Left Shoulder - Call [phone] to schedule MRI   Discussed activity considerations and other supportive care including Ice/Heat, OTC and other topical medications as needed.  Continue Physical Therapy    -We also discussed other future treatment options:  Referral to Orthopedic Surgery    Follow Up: In clinic with Dr. Chaudhari after MRI (wait at least 1-2 days)     Concerning signs and symptoms were reviewed.  The patient expressed understanding of this management plan and all questions were answered at this time.    Almaz Chaudhari MD [facility]  Sports Medicine Physician  [facility]      SUBJECTIVE- Interim History October 21, 2022    Chief Complaint   Patient presents with     Left Shoulder - Pain, Follow Up       Michael Ugarte is a 67 year old male who is seen in f/u up for Shoulder joint dislocation, left, subsequent encounter. Since last visit on 9/21/22 patient has been feeling better. He is sore from physical therapy, but feels really good.  Physical Therapy is going great and he is progressing well. He continues to report numbness in the anterior forearm as well.  - Now ~ 7 weeks from initial onset; Patient describes injury as falling backwards into a tub.     Worsened by: moving a certain way, dressing, bathing, overhead  Better  with: physical therapy, time, resting   Treatments tried: rest/activity avoidance, ibuprofen 800mg, previous imaging (xray 9/5/22) and casting/splinting/bracing, HEP, physical therapy (2 visits)  Associated symptoms: improved; weakness of left shoulder and feeling of instability, continues to feel; numbness and tingling in his hand/arm     Orthopedic/Surgical history: NO  Social History/Occupation: retired    No family history pertinent to patient's problem today.    REVIEW OF SYSTEMS:  Review of Systems  Skin: no bruising, no swelling  Musculoskeletal: as above  Neurologic: no numbness, paresthesias  Remainder of review of systems is negative including constitutional, CV, pulmonary, GI, except as noted in HPI or medical history.    OBJECTIVE:  /86   Pulse (!) 123   Wt 79.4 kg (175 lb)   BMI 27.41 kg/m       General: healthy, alert and in no distress  HEENT: no scleral icterus or conjunctival erythema  Skin: no suspicious lesions or rash. No jaundice.  CV: distal perfusion intact  Resp: normal respiratory effort without conversational dyspnea   Psych: normal mood and affect  Gait: normal steady gait with appropriate coordination and balance  Neuro: Normal light sensory exam of upper extremity     Bilateral Shoulder exam  Inspection and Posture:       normal     Skin:        no visible deformities     Tender:        none     Non Tender:       remainder of shoulder bilateral     ROM:        forward flexion 120  left       abduction 120 left       internal rotation mimimal left       external rotation 25 left       asymmetric scapular motion     Painful motions:       flexion left       elevation left     Strength:        abduction 4/5 left       flexion 4/5 left       internal rotation 4/5 left       external rotation 4/5 left     Sensation:        Sensation intact over shoulder and upper extremity   - patient does describe anterior forearm altered sensation    RADIOLOGY:  Final results and radiologist's  interpretation, available in the [facility] health record.  Images were reviewed with the patient in the office today.  My personal interpretation of the performed imaging:   3 XR views of right shoulder reviewed 9/21/2022: no acute bony abnormality, old healed clavicle deformity, no significant degenerative change      Review of the result(s) of each unique test - XR             Again, thank you for allowing me to participate in the care of your patient.        Sincerely,        Almaz Chaudhari MD

## 2022-10-21 NOTE — PATIENT INSTRUCTIONS
We discussed these other possible diagnosis: given concern for rotator cuff injury, will obtain MRI    Plan:  - Today's Plan of Care:  MRI of the Left Shoulder - Call [phone] to schedule MRI   Discussed activity considerations and other supportive care including Ice/Heat, OTC and other topical medications as needed.  Continue Physical Therapy    -We also discussed other future treatment options:  Referral to Orthopedic Surgery    Follow Up: In clinic with Dr. Chaudhari after MRI (wait at least 1-2 days)     If you have any further questions for your physician or physician s care team you can call [phone] and use option 3 to leave a voice message.

## 2022-10-26 ENCOUNTER — HOSPITAL ENCOUNTER (OUTPATIENT)
Dept: PHYSICAL THERAPY | Facility: CLINIC | Age: 67
Setting detail: THERAPIES SERIES
Discharge: HOME | End: 2022-10-26
Attending: PEDIATRICS
Payer: COMMERCIAL

## 2022-10-26 PROCEDURE — 97110 THERAPEUTIC EXERCISES: CPT | Mod: GP | Performed by: PHYSICAL THERAPIST

## 2022-10-31 ENCOUNTER — HOSPITAL ENCOUNTER (OUTPATIENT)
Dept: MRI IMAGING | Facility: CLINIC | Age: 67
Discharge: HOME | End: 2022-10-31
Attending: PEDIATRICS | Admitting: PEDIATRICS
Payer: COMMERCIAL

## 2022-10-31 DIAGNOSIS — S43.005D: ICD-10-CM

## 2022-10-31 PROCEDURE — 73221 MRI JOINT UPR EXTREM W/O DYE: CPT | Mod: 26 | Performed by: RADIOLOGY

## 2022-10-31 PROCEDURE — 73221 MRI JOINT UPR EXTREM W/O DYE: CPT | Mod: LT

## 2022-11-10 PROBLEM — S43.005D: Status: ACTIVE | Noted: 2022-10-21

## 2022-11-10 NOTE — PROGRESS NOTES
ASSESSMENT & PLAN     was seen today for pain and mri transcription.    Diagnoses and all orders for this visit:    Shoulder joint dislocation, left, subsequent encounter    Complete tear of left rotator cuff, unspecified whether traumatic      This issue is acute and Improving.    We discussed these other possible diagnosis: left shoulder dislocation, likely more chronic rotator cuff tear.  Discussed given rotator cuff tear appears chronic, primary repair with orthopedic surgery would be challenging.  I did review these images with orthopedic surgery.  Discussed continued supportive care including physical therapy for motion and strengthening, consideration of injections if shoulder becomes more painful.  May have decreased strength in the shoulder due to these injuries.  - Left arm numbness likely stretched nerve, hopefully will improve with time.    Plan:  - Today's Plan of Care:  Continue Physical Therapy and Home Exercise Program  Discussed activity considerations and other supportive care including Ice/Heat, OTC and other topical medications as needed.    -We also discussed other future treatment options:  Consideration of injections  Referral to Orthopedic Surgery - most reliable surgery would be shoulder replacement    Follow Up: as needed    Concerning signs and symptoms were reviewed.  The patient expressed understanding of this management plan and all questions were answered at this time.    Almaz Chaudhari MD [facility]  Sports Medicine Physician  [facility]    SUBJECTIVE- Interim History November 10, 2022    Chief Complaint   Patient presents with     Left Shoulder - Pain, MRI Transcription       Michael Ugarte is a 67 year old male who is seen in f/u up for    Shoulder joint dislocation, left, subsequent encounter  Complete tear of left rotator cuff, unspecified whether traumatic.  Since last visit on 10/21/22, patient has been doing really well, he has been making great  improvements in physical therapy. States he continues to have numbness that is starting in his wrist and that goes to the elbow medially.  - Now ~ 9/5/22, 2 months from initial onset    Worsened by: moving a certain way, dressing, bathing, overhead  Better with: physical therapy, time, resting   Treatments tried: rest/activity avoidance, ibuprofen 800mg, previous imaging (xray 9/5/22, MRI 10/31/22) and casting/splinting/bracing, HEP, physical therapy (3 visits)  Associated symptoms: improved; weakness of left shoulder and feeling of instability, continues to feel; numbness and tingling in his hand/arm     Orthopedic/Surgical history: NO  Social History/Occupation: retired    No family history pertinent to patient's problem today.    REVIEW OF SYSTEMS:  Review of Systems  Skin: no bruising, no swelling  Musculoskeletal: as above  Neurologic: no numbness, paresthesias  Remainder of review of systems is negative including constitutional, CV, pulmonary, GI, except as noted in HPI or medical history.    OBJECTIVE:  BP (!) 144/97   Pulse 93   Wt 79.4 kg (175 lb)   BMI 27.41 kg/m       General: healthy, alert and in no distress  HEENT: no scleral icterus or conjunctival erythema  Skin: no suspicious lesions or rash. No jaundice.  CV: distal perfusion intact  Resp: normal respiratory effort without conversational dyspnea   Psych: normal mood and affect  Gait: normal steady gait with appropriate coordination and balance  Neuro: Normal light sensory exam of upper extremity     Bilateral Shoulder exam  Inspection and Posture:       normal     Skin:        no visible deformities     Tender:        none     Non Tender:       remainder of shoulder bilateral     ROM:        forward flexion 170  left       abduction 170 left       internal rotation improved left       external rotation 45 left       asymmetric scapular motion     Painful motions: none    Strength:        abduction 4/5 left       flexion 5-/5 left       internal  rotation 4/5 left       external rotation 4/5 left     Sensation:        Sensation intact over shoulder and upper extremity   - patient does describe anterior forearm altered sensation    RADIOLOGY:  Final results and radiologist's interpretation, available in the [facility] health record.  Images were reviewed with the patient in the office today.  My personal interpretation of the performed imaging:   Reviewed MRI 10/31/2022 -full-thickness rotator cuff tear, significant atrophy of rotator cuff muscles, chronic posterior glenoid fracture    3 XR views of right shoulder reviewed 9/21/2022: no acute bony abnormality, old healed clavicle deformity, no significant degenerative change    Results for orders placed or performed during the hospital encounter of 10/31/22   MR Shoulder Left w/o Contrast    Narrative    EXAM: MR left shoulder without  contrast 10/31/2022 2:39 PM    TECHNIQUE: Multiplanar, multisequence imaging of the left shoulder  were obtained without administration of intravenous or intra-articular  gadolinium contrast using routine protocol.    History: Shoulder joint dislocation, left, subsequent encounter     Additional Clinical information from EMR: Patient status post shoulder  dislocation approximately 7 weeks ago.    Comparison: Left shoulder radiograph 9/21/2022, 9/5/2022    Findings:    ROTATOR CUFF and ASSOCIATED STRUCTURES  Rotator cuff: High-grade bursal sided tearing of the anterior to mid  supraspinous at the footprint. Focal full-thickness, partial width  tearing of the junctional fibers of the supraspinatus and  infraspinatus (series 6 image 15). Moderate grade intrasubstance tear  of the mid infraspinatus near the footprint. Moderate to high-grade  articular sided tearing of the mid through upper subscapularis at the  footprint. Teres minor is intact and unremarkable.    Bursa: Mild subacromial and subdeltoid bursal fluid.    Musculature: Significant fatty infiltration of the mid to  upper  subscapularis. Moderate supraspinatus atrophy. Deltoid muscle bulk is  also preserved.  No muscle edema.    Acromioclavicular joint  There are mild degenerative changes of the acromioclavicular joint.  Acromion is type 2 in sagittal morphology.  Coracoacromial ligament is  not thickened.    OSSEOUS STRUCTURES  Subchondral cystic changes at the footplate with trace residual mild  edema of the humeral head. Posterior glenoid fracture again noted,  better appreciated on prior radiograph.    LONG BICIPITAL TENDON  Splitting of the intra-articular biceps tendon with partial tearing of  the intra-articular biceps tendon.    GLENOHUMERAL JOINT  Joint fluid: Small joint effusion.    Cartilage and subarticular bone:  No focal hyaline cartilage defects  are noted. No Hill-Sachs, reverse Hill-Sachs, or bony Bankart lesions  are seen.    Labrum: Posterior superior labral tear    ANCILLARY FINDINGS:  None      Impression    Impression:    1. Rotator cuff:  *  Focal full-thickness tear of the junctional fibers of the  supraspinatus and infraspinatus.  *  High-grade bursal sided tearing of the anterior through mid  supraspinatus.  *  Moderate to high-grade articular sided tearing of the mid through  upper subscapularis. Associated medialization of the biceps tendon.  *  Moderate grade tear of the mid fibers of the infraspinatus.  *  Moderate supraspinatus atrophy. Significant atrophy of the mid and  upper subscapularis.    2. Partial tearing of the intra-articular biceps tendon with splitting  of portion of the extra-articular biceps tendon    3. Chronic posterior glenoid fracture, better appreciated on prior  radiographs.    I have personally reviewed the examination and initial interpretation  and I agree with the findings.    SKY MALDONADO MD (Joe)         SYSTEM ID:  [id number]       Review of the result(s) of each unique test - XR and MRI

## 2022-11-11 ENCOUNTER — OFFICE VISIT (OUTPATIENT)
Dept: ORTHOPEDICS | Facility: CLINIC | Age: 67
End: 2022-11-11
Payer: COMMERCIAL

## 2022-11-11 VITALS
DIASTOLIC BLOOD PRESSURE: 97 MMHG | SYSTOLIC BLOOD PRESSURE: 144 MMHG | WEIGHT: 175 LBS | BODY MASS INDEX: 27.41 KG/M2 | HEART RATE: 93 BPM

## 2022-11-11 DIAGNOSIS — M75.122 COMPLETE TEAR OF LEFT ROTATOR CUFF, UNSPECIFIED WHETHER TRAUMATIC: ICD-10-CM

## 2022-11-11 DIAGNOSIS — S43.005D: Primary | ICD-10-CM

## 2022-11-11 PROCEDURE — 99213 OFFICE O/P EST LOW 20 MIN: CPT | Performed by: PEDIATRICS

## 2022-11-11 NOTE — LETTER
11/11/2022         RE: Michael Ugarte  [address] [facility] [id number]        Dear Colleague,    Thank you for referring your patient, Michael Ugarte, to the [facility] [place]. Please see a copy of my visit note below.    ASSESSMENT & PLAN     was seen today for pain and mri transcription.    Diagnoses and all orders for this visit:    Shoulder joint dislocation, left, subsequent encounter    Complete tear of left rotator cuff, unspecified whether traumatic      This issue is acute and Improving.    We discussed these other possible diagnosis: left shoulder dislocation, likely more chronic rotator cuff tear.  Discussed given rotator cuff tear appears chronic, primary repair with orthopedic surgery would be challenging.  I did review these images with orthopedic surgery.  Discussed continued supportive care including physical therapy for motion and strengthening, consideration of injections if shoulder becomes more painful.  May have decreased strength in the shoulder due to these injuries.  - Left arm numbness likely stretched nerve, hopefully will improve with time.    Plan:  - Today's Plan of Care:  Continue Physical Therapy and Home Exercise Program  Discussed activity considerations and other supportive care including Ice/Heat, OTC and other topical medications as needed.    -We also discussed other future treatment options:  Consideration of injections  Referral to Orthopedic Surgery - most reliable surgery would be shoulder replacement    Follow Up: as needed    Concerning signs and symptoms were reviewed.  The patient expressed understanding of this management plan and all questions were answered at this time.    Almaz Chaudahri MD [facility]  Sports Medicine Physician  [facility]    SUBJECTIVE- Interim History November 10, 2022    Chief Complaint   Patient presents with     Left Shoulder - Pain, MRI Transcription       Michael Ugarte is a 67  year old male who is seen in f/u up for    Shoulder joint dislocation, left, subsequent encounter  Complete tear of left rotator cuff, unspecified whether traumatic.  Since last visit on 10/21/22, patient has been doing really well, he has been making great improvements in physical therapy. States he continues to have numbness that is starting in his wrist and that goes to the elbow medially.  - Now ~ 9/5/22, 2 months from initial onset    Worsened by: moving a certain way, dressing, bathing, overhead  Better with: physical therapy, time, resting   Treatments tried: rest/activity avoidance, ibuprofen 800mg, previous imaging (xray 9/5/22, MRI 10/31/22) and casting/splinting/bracing, HEP, physical therapy (3 visits)  Associated symptoms: improved; weakness of left shoulder and feeling of instability, continues to feel; numbness and tingling in his hand/arm     Orthopedic/Surgical history: NO  Social History/Occupation: retired    No family history pertinent to patient's problem today.    REVIEW OF SYSTEMS:  Review of Systems  Skin: no bruising, no swelling  Musculoskeletal: as above  Neurologic: no numbness, paresthesias  Remainder of review of systems is negative including constitutional, CV, pulmonary, GI, except as noted in HPI or medical history.    OBJECTIVE:  BP (!) 144/97   Pulse 93   Wt 79.4 kg (175 lb)   BMI 27.41 kg/m       General: healthy, alert and in no distress  HEENT: no scleral icterus or conjunctival erythema  Skin: no suspicious lesions or rash. No jaundice.  CV: distal perfusion intact  Resp: normal respiratory effort without conversational dyspnea   Psych: normal mood and affect  Gait: normal steady gait with appropriate coordination and balance  Neuro: Normal light sensory exam of upper extremity     Bilateral Shoulder exam  Inspection and Posture:       normal     Skin:        no visible deformities     Tender:        none     Non Tender:       remainder of shoulder  bilateral     ROM:        forward flexion 170  left       abduction 170 left       internal rotation improved left       external rotation 45 left       asymmetric scapular motion     Painful motions: none    Strength:        abduction 4/5 left       flexion 5-/5 left       internal rotation 4/5 left       external rotation 4/5 left     Sensation:        Sensation intact over shoulder and upper extremity   - patient does describe anterior forearm altered sensation    RADIOLOGY:  Final results and radiologist's interpretation, available in the [facility] health record.  Images were reviewed with the patient in the office today.  My personal interpretation of the performed imaging:   Reviewed MRI 10/31/2022 -full-thickness rotator cuff tear, significant atrophy of rotator cuff muscles, chronic posterior glenoid fracture    3 XR views of right shoulder reviewed 9/21/2022: no acute bony abnormality, old healed clavicle deformity, no significant degenerative change    Results for orders placed or performed during the hospital encounter of 10/31/22   MR Shoulder Left w/o Contrast    Narrative    EXAM: MR left shoulder without  contrast 10/31/2022 2:39 PM    TECHNIQUE: Multiplanar, multisequence imaging of the left shoulder  were obtained without administration of intravenous or intra-articular  gadolinium contrast using routine protocol.    History: Shoulder joint dislocation, left, subsequent encounter     Additional Clinical information from EMR: Patient status post shoulder  dislocation approximately 7 weeks ago.    Comparison: Left shoulder radiograph 9/21/2022, 9/5/2022    Findings:    ROTATOR CUFF and ASSOCIATED STRUCTURES  Rotator cuff: High-grade bursal sided tearing of the anterior to mid  supraspinous at the footprint. Focal full-thickness, partial width  tearing of the junctional fibers of the supraspinatus and  infraspinatus (series 6 image 15). Moderate grade intrasubstance tear  of the mid infraspinatus near the  footprint. Moderate to high-grade  articular sided tearing of the mid through upper subscapularis at the  footprint. Teres minor is intact and unremarkable.    Bursa: Mild subacromial and subdeltoid bursal fluid.    Musculature: Significant fatty infiltration of the mid to upper  subscapularis. Moderate supraspinatus atrophy. Deltoid muscle bulk is  also preserved.  No muscle edema.    Acromioclavicular joint  There are mild degenerative changes of the acromioclavicular joint.  Acromion is type 2 in sagittal morphology.  Coracoacromial ligament is  not thickened.    OSSEOUS STRUCTURES  Subchondral cystic changes at the footplate with trace residual mild  edema of the humeral head. Posterior glenoid fracture again noted,  better appreciated on prior radiograph.    LONG BICIPITAL TENDON  Splitting of the intra-articular biceps tendon with partial tearing of  the intra-articular biceps tendon.    GLENOHUMERAL JOINT  Joint fluid: Small joint effusion.    Cartilage and subarticular bone:  No focal hyaline cartilage defects  are noted. No Hill-Sachs, reverse Hill-Sachs, or bony Bankart lesions  are seen.    Labrum: Posterior superior labral tear    ANCILLARY FINDINGS:  None      Impression    Impression:    1. Rotator cuff:  *  Focal full-thickness tear of the junctional fibers of the  supraspinatus and infraspinatus.  *  High-grade bursal sided tearing of the anterior through mid  supraspinatus.  *  Moderate to high-grade articular sided tearing of the mid through  upper subscapularis. Associated medialization of the biceps tendon.  *  Moderate grade tear of the mid fibers of the infraspinatus.  *  Moderate supraspinatus atrophy. Significant atrophy of the mid and  upper subscapularis.    2. Partial tearing of the intra-articular biceps tendon with splitting  of portion of the extra-articular biceps tendon    3. Chronic posterior glenoid fracture, better appreciated on prior  radiographs.    I have personally reviewed  the examination and initial interpretation  and I agree with the findings.    SKY MALDONADO MD (Joe)         SYSTEM ID:  [id number]       Review of the result(s) of each unique test - XR and MRI             Again, thank you for allowing me to participate in the care of your patient.        Sincerely,        Almaz Chaudhari MD

## 2022-11-11 NOTE — PATIENT INSTRUCTIONS
We discussed these other possible diagnosis: left shoulder dislocation, likely more chronic rotator cuff tear.  Left arm numbness likely stretched nerve, hopefully will improve with time.    Plan:  - Today's Plan of Care:  Continue Physical Therapy and Home Exercise Program  Discussed activity considerations and other supportive care including Ice/Heat, OTC and other topical medications as needed.    -We also discussed other future treatment options:  Consideration of injections  Referral to Orthopedic Surgery - most reliable surgery would be shoulder replacement    Follow Up: as needed    If you have any further questions for your physician or physician s care team you can call [phone] and use option 3 to leave a voice message.  
No

## 2022-11-29 NOTE — PROGRESS NOTES
OUTPATIENT PHYSICAL THERAPY DISCHARGE SUMMARY   Almaz Chaudhari MD 10/5/22 to 10/26/22 1100   Signing Clinician's Name / Credentials   Signing clinician's name / credentials Luma Newton PT   Session Number   Session Number 3   Adult Goals   PT Ortho Eval Goals 1;2;3;4;5   Ortho Goal 1   Goal Identifier 1.   Goal Description Pt willl be able to reach to 145* for improved tolerance to ADL's   Target Date 11/19/22   Ortho Goal 2   Goal Identifier 2.   Goal Description Pt  will be able to reach behind to tuck in shirt w/o difficulty   Target Date 11/19/22   Ortho Goal 3   Goal Identifier 3.   Goal Description Pt will be able to lift upto 15# w/ minimal to no difficulty   Target Date 12/14/22   Ortho Goal 4   Goal Identifier 4.   Goal Description Pt will be able to lie on L side and wake no > 4x/week due to shoulder   Target Date 12/14/22   Ortho Goal 5   Goal Identifier 5.   Goal Description Pt will be independent and consistent w/HEP to manage symptoms   Target Date 12/14/22   Subjective Report   Subjective Report Pt notes he has been less consistent w/ ex last couple days.  Pt reports shoulder pain 2/10.   Pt is now able to lie partially on L side.  Pt saw MD and will be having MRI on 10/31/22 due to ongoing numbness.   Objective Measure    L shoulder AROM flex 165*,  scaption 165*, ER 76*,  IR to T8   Treatment Interventions   Therapeutic Procedure/exercise   Treatment Detail Standing wand scaption / ER X 5 each.   Supported pendulums X 5 B.   Wall flex X 5.  LT sets.  shoulder flex X 18 and  scaption X 14 (fatigued).  Bicep curl 4# X 10 , 2# X  10(fatigued w/ 4#, no pain--will start w/ 2# at home).  RTB low row x 20.   ER in SL w/ towel roll X 18.  supine shoulder flex from towel roll start/ end w/ elbow bent X 10.   Plan   Home program ex as above, ice   Plan  Pt failed to show for last scheduled visit.  Pt did not reschedule.  Discharge from physical therapy   Comments   Comments Unable to report progress  towards goals as pt did not return   Medicare Claim Information   Medical Diagnosis L shoulder dislocation   PT Diagnosis L shoulder dislocation

## 2023-02-01 ENCOUNTER — DOCUMENTATION ONLY (OUTPATIENT)
Facility: CLINIC | Age: 68
End: 2023-02-01
Payer: COMMERCIAL

## 2023-02-01 DIAGNOSIS — S43.005D: Primary | ICD-10-CM

## (undated) RX ORDER — PROPOFOL 10 MG/ML
INJECTION, EMULSION INTRAVENOUS
Status: DISPENSED
Start: 2022-09-05